# Patient Record
Sex: FEMALE | Race: WHITE
[De-identification: names, ages, dates, MRNs, and addresses within clinical notes are randomized per-mention and may not be internally consistent; named-entity substitution may affect disease eponyms.]

---

## 2017-01-06 ENCOUNTER — HOSPITAL ENCOUNTER (OUTPATIENT)
Dept: HOSPITAL 80 - BRMIMAGING | Age: 42
End: 2017-01-06
Attending: GENERAL ACUTE CARE HOSPITAL
Payer: COMMERCIAL

## 2017-01-06 DIAGNOSIS — Z12.31: Primary | ICD-10-CM

## 2017-01-06 PROCEDURE — G0202 SCR MAMMO BI INCL CAD: HCPCS

## 2017-01-06 NOTE — MA
Screening Digital Mammogram

 

Clinical Indications: Routine screening.  

 

Technique:  Standard cephalocaudal and mediolateral oblique projections are obtained.  This examinati
on was processed by the [employer] computer aided detection system.

 

Comparison: August 2014

 

Breast density: C; The breast tissue is heterogeneously dense, which could obscure detection of small
 masses. 

 

Findings: CAD was reviewed. Focal architectural distortion versus overlapping parenchymal structures 
upper right breast seen on the oblique lateral view only. If real, this is likely in the upper or out
er right breast on the CC view.

 

Impression: Possible new architectural distortion right breast. 

 

BI-RADS 0: Needs additional imaging evaluation, right breast..

 

Recommendation:  Spot compression views, true-lateral view and ultrasound at the discretion of the in
terpreting radiologist for further evaluation. 

 

 Please fax a written or electronic order for a right breast diagnostic mammogram and ultrasound to [phone]
[phone].

 

[facility] will send a result letter to the patient.

 

Negative mammography should not preclude additional workup of a clinically suspicious finding.

 

The patient's information is entered into a reminder system with a target due date for her next mammo
gram.

## 2017-01-10 ENCOUNTER — HOSPITAL ENCOUNTER (OUTPATIENT)
Dept: HOSPITAL 80 - FIMAGING | Age: 42
End: 2017-01-10
Attending: FAMILY MEDICINE
Payer: COMMERCIAL

## 2017-01-10 DIAGNOSIS — R92.8: Primary | ICD-10-CM

## 2017-01-10 PROCEDURE — G0206 DX MAMMO INCL CAD UNI: HCPCS

## 2017-01-10 NOTE — MA
Diagnostic Digital Mammogram Right Breast With iCAD Analysis

 

Reason for examination: Evaluate possible focal architectural change noted in the upper right breast 
the oblique view from the screening study January 6, 2017.

 

Technique: Oblique, true lateral and craniocaudal spot compression views are obtained. Also, a true l
ateral is performed. The examination is processed by the iCAD computer-aided detection system.

 

Findings: The abnormality does not persist on diagnostic evaluation and it was probably related to rocha
perimposition of normal glandular elements on the screening study.  

 

Impression: Negative diagnostic mammography.  BI-RADS: 1.

 

Recommendation: Resume routine mammographic screening in one year as long as physical examination is 
negative. 

 

A verbal report was given to the patient.

[facility] with send a result letter.

## 2018-03-05 ENCOUNTER — HOSPITAL ENCOUNTER (OUTPATIENT)
Dept: HOSPITAL 80 - FIMAGING | Age: 43
End: 2018-03-05
Attending: FAMILY MEDICINE
Payer: COMMERCIAL

## 2018-03-05 DIAGNOSIS — Z12.31: Primary | ICD-10-CM
